# Patient Record
Sex: FEMALE | URBAN - METROPOLITAN AREA
[De-identification: names, ages, dates, MRNs, and addresses within clinical notes are randomized per-mention and may not be internally consistent; named-entity substitution may affect disease eponyms.]

---

## 2019-01-07 ENCOUNTER — OFFICE VISIT (OUTPATIENT)
Dept: FAMILY MEDICINE CLINIC | Age: 24
End: 2019-01-07

## 2019-01-07 ENCOUNTER — HOSPITAL ENCOUNTER (OUTPATIENT)
Dept: LAB | Age: 24
Discharge: HOME | End: 2019-01-07
Payer: COMMERCIAL

## 2019-01-07 VITALS
RESPIRATION RATE: 18 BRPM | BODY MASS INDEX: 25.71 KG/M2 | TEMPERATURE: 99.2 F | DIASTOLIC BLOOD PRESSURE: 84 MMHG | HEART RATE: 85 BPM | WEIGHT: 150.6 LBS | SYSTOLIC BLOOD PRESSURE: 125 MMHG | OXYGEN SATURATION: 95 % | HEIGHT: 64 IN

## 2019-01-07 DIAGNOSIS — R10.9 STOMACH PAIN: Primary | ICD-10-CM

## 2019-01-07 DIAGNOSIS — A04.8 BACTERIAL INFECTION DUE TO H. PYLORI: ICD-10-CM

## 2019-01-07 DIAGNOSIS — R11.2 NAUSEA AND VOMITING, INTRACTABILITY OF VOMITING NOT SPECIFIED, UNSPECIFIED VOMITING TYPE: ICD-10-CM

## 2019-01-07 DIAGNOSIS — R10.9 STOMACH PAIN: ICD-10-CM

## 2019-01-07 LAB
ALBUMIN SERPL-MCNC: 4 G/DL (ref 3.4–5)
ALBUMIN/GLOB SERPL: 1.1 {RATIO} (ref 0.8–1.7)
ALP SERPL-CCNC: 81 U/L (ref 45–117)
ALT SERPL-CCNC: 24 U/L (ref 13–56)
ANION GAP SERPL CALC-SCNC: 9 MMOL/L (ref 3–18)
AST SERPL-CCNC: 15 U/L (ref 15–37)
BASOPHILS # BLD: 0 K/UL (ref 0–0.1)
BASOPHILS NFR BLD: 0 % (ref 0–2)
BILIRUB SERPL-MCNC: 0.5 MG/DL (ref 0.2–1)
BUN SERPL-MCNC: 11 MG/DL (ref 7–18)
BUN/CREAT SERPL: 12 (ref 12–20)
CALCIUM SERPL-MCNC: 9.3 MG/DL (ref 8.5–10.1)
CHLORIDE SERPL-SCNC: 103 MMOL/L (ref 100–108)
CO2 SERPL-SCNC: 24 MMOL/L (ref 21–32)
CREAT SERPL-MCNC: 0.91 MG/DL (ref 0.6–1.3)
DIFFERENTIAL METHOD BLD: ABNORMAL
EOSINOPHIL # BLD: 0.1 K/UL (ref 0–0.4)
EOSINOPHIL NFR BLD: 2 % (ref 0–5)
ERYTHROCYTE [DISTWIDTH] IN BLOOD BY AUTOMATED COUNT: 12 % (ref 11.6–14.5)
GLOBULIN SER CALC-MCNC: 3.6 G/DL (ref 2–4)
GLUCOSE SERPL-MCNC: 82 MG/DL (ref 74–99)
HCT VFR BLD AUTO: 46 % (ref 35–45)
HGB BLD-MCNC: 15.7 G/DL (ref 12–16)
LIPASE SERPL-CCNC: 160 U/L (ref 73–393)
LYMPHOCYTES # BLD: 2.2 K/UL (ref 0.9–3.6)
LYMPHOCYTES NFR BLD: 27 % (ref 21–52)
MCH RBC QN AUTO: 30 PG (ref 24–34)
MCHC RBC AUTO-ENTMCNC: 34.1 G/DL (ref 31–37)
MCV RBC AUTO: 88 FL (ref 74–97)
MONOCYTES # BLD: 0.6 K/UL (ref 0.05–1.2)
MONOCYTES NFR BLD: 7 % (ref 3–10)
NEUTS SEG # BLD: 5.3 K/UL (ref 1.8–8)
NEUTS SEG NFR BLD: 64 % (ref 40–73)
PLATELET # BLD AUTO: 261 K/UL (ref 135–420)
PMV BLD AUTO: 9.2 FL (ref 9.2–11.8)
POTASSIUM SERPL-SCNC: 4.3 MMOL/L (ref 3.5–5.5)
PROT SERPL-MCNC: 7.6 G/DL (ref 6.4–8.2)
RBC # BLD AUTO: 5.23 M/UL (ref 4.2–5.3)
SODIUM SERPL-SCNC: 136 MMOL/L (ref 136–145)
WBC # BLD AUTO: 8.2 K/UL (ref 4.6–13.2)

## 2019-01-07 PROCEDURE — 83690 ASSAY OF LIPASE: CPT

## 2019-01-07 PROCEDURE — 86677 HELICOBACTER PYLORI ANTIBODY: CPT

## 2019-01-07 PROCEDURE — 85025 COMPLETE CBC W/AUTO DIFF WBC: CPT

## 2019-01-07 PROCEDURE — 80053 COMPREHEN METABOLIC PANEL: CPT

## 2019-01-07 RX ORDER — OMEPRAZOLE 20 MG/1
20 CAPSULE, DELAYED RELEASE ORAL DAILY
Qty: 30 CAP | Refills: 1 | Status: SHIPPED | OUTPATIENT
Start: 2019-01-07

## 2019-01-07 RX ORDER — ONDANSETRON 4 MG/1
4 TABLET, ORALLY DISINTEGRATING ORAL
Qty: 20 TAB | Refills: 0 | Status: SHIPPED | OUTPATIENT
Start: 2019-01-07 | End: 2019-02-18 | Stop reason: SDUPTHER

## 2019-01-07 NOTE — PATIENT INSTRUCTIONS
Nausea and Vomiting: Care Instructions  Your Care Instructions    When you are nauseated, you may feel weak and sweaty and notice a lot of saliva in your mouth. Nausea often leads to vomiting. Most of the time you do not need to worry about nausea and vomiting, but they can be signs of other illnesses. Two common causes of nausea and vomiting are stomach flu and food poisoning. Nausea and vomiting from viral stomach flu will usually start to improve within 24 hours. Nausea and vomiting from food poisoning may last from 12 to 48 hours. The doctor has checked you carefully, but problems can develop later. If you notice any problems or new symptoms, get medical treatment right away. Follow-up care is a key part of your treatment and safety. Be sure to make and go to all appointments, and call your doctor if you are having problems. It's also a good idea to know your test results and keep a list of the medicines you take. How can you care for yourself at home? · To prevent dehydration, drink plenty of fluids, enough so that your urine is light yellow or clear like water. Choose water and other caffeine-free clear liquids until you feel better. If you have kidney, heart, or liver disease and have to limit fluids, talk with your doctor before you increase the amount of fluids you drink. · Rest in bed until you feel better. · When you are able to eat, try clear soups, mild foods, and liquids until all symptoms are gone for 12 to 48 hours. Other good choices include dry toast, crackers, cooked cereal, and gelatin dessert, such as Jell-O. When should you call for help? Call 911 anytime you think you may need emergency care. For example, call if:    · You passed out (lost consciousness).    Call your doctor now or seek immediate medical care if:    · You have symptoms of dehydration, such as:  ? Dry eyes and a dry mouth. ? Passing only a little dark urine. ?  Feeling thirstier than usual.     · You have new or worsening belly pain.     · You have a new or higher fever.     · You vomit blood or what looks like coffee grounds.    Watch closely for changes in your health, and be sure to contact your doctor if:    · You have ongoing nausea and vomiting.     · Your vomiting is getting worse.     · Your vomiting lasts longer than 2 days.     · You are not getting better as expected. Where can you learn more? Go to http://fermin-chano.info/. Enter 25 [id number] in the search box to learn more about \"Nausea and Vomiting: Care Instructions. \"  Current as of: November 20, 2017  Content Version: 11.8  © 8724-9006 [employer]. Care instructions adapted under license by [employer] (which disclaims liability or warranty for this information). If you have questions about a medical condition or this instruction, always ask your healthcare professional. [address] any warranty or liability for your use of this information. Abdominal Pain: Care Instructions  Your Care Instructions    Abdominal pain has many possible causes. Some aren't serious and get better on their own in a few days. Others need more testing and treatment. If your pain continues or gets worse, you need to be rechecked and may need more tests to find out what is wrong. You may need surgery to correct the problem. Don't ignore new symptoms, such as fever, nausea and vomiting, urination problems, pain that gets worse, and dizziness. These may be signs of a more serious problem. Your doctor may have recommended a follow-up visit in the next 8 to 12 hours. If you are not getting better, you may need more tests or treatment. The doctor has checked you carefully, but problems can develop later. If you notice any problems or new symptoms, get medical treatment right away. Follow-up care is a key part of your treatment and safety.  Be sure to make and go to all appointments, and call your doctor if you are having problems. It's also a good idea to know your test results and keep a list of the medicines you take. How can you care for yourself at home? · Rest until you feel better. · To prevent dehydration, drink plenty of fluids, enough so that your urine is light yellow or clear like water. Choose water and other caffeine-free clear liquids until you feel better. If you have kidney, heart, or liver disease and have to limit fluids, talk with your doctor before you increase the amount of fluids you drink. · If your stomach is upset, eat mild foods, such as rice, dry toast or crackers, bananas, and applesauce. Try eating several small meals instead of two or three large ones. · Wait until 48 hours after all symptoms have gone away before you have spicy foods, alcohol, and drinks that contain caffeine. · Do not eat foods that are high in fat. · Avoid anti-inflammatory medicines such as aspirin, ibuprofen (Advil, Motrin), and naproxen (Aleve). These can cause stomach upset. Talk to your doctor if you take daily aspirin for another health problem. When should you call for help? Call 911 anytime you think you may need emergency care. For example, call if:    · You passed out (lost consciousness).     · You pass maroon or very bloody stools.     · You vomit blood or what looks like coffee grounds.     · You have new, severe belly pain.    Call your doctor now or seek immediate medical care if:    · Your pain gets worse, especially if it becomes focused in one area of your belly.     · You have a new or higher fever.     · Your stools are black and look like tar, or they have streaks of blood.     · You have unexpected vaginal bleeding.     · You have symptoms of a urinary tract infection. These may include:  ? Pain when you urinate. ? Urinating more often than usual.  ?  Blood in your urine.     · You are dizzy or lightheaded, or you feel like you may faint.    Watch closely for changes in your health, and be sure to contact your doctor if:    · You are not getting better after 1 day (24 hours). Where can you learn more? Go to http://fermin-chano.info/. Enter V041 in the search box to learn more about \"Abdominal Pain: Care Instructions. \"  Current as of: November 20, 2017  Content Version: 11.8  © 7743-4241 [employer]. Care instructions adapted under license by [employer] (which disclaims liability or warranty for this information). If you have questions about a medical condition or this instruction, always ask your healthcare professional. [place] [address] any warranty or liability for your use of this information.

## 2019-01-07 NOTE — PROGRESS NOTES
Chief Complaint   Patient presents with    Vomiting         Mandy Barajas is a 20y.o. year old  female who presents for the first time to our practice with c/o persistent Nausea, Vomiting and Stomach pain. Symptoms started the 1 week of November. When to [facility] where she was prescribed Zofran. States that it helps with nausea. States that she usually vomit early in the morning and at night after dinner. Vomitus is clear and thin. Last time she vomited was this past Saturday. Pain is worse when her stomach is empty but also have pain (unsettling stomach) after she eats. Denies chest pain, bloody emesis, bloody or tarry stools. Previous PCP: [facility].    Med review done  Medical Hx updated in the chart    Will request medical record for review. Review of Systems:   Pt denies: Wt loss, Fever/Chills, HA, Visual changes, Fatigue, Chest pain, SOB, MUIR, Abd pain, N/V/D/C, Blood in stool or urine, Edema. Pertinent positive as above in HPI. All others negative. There is no problem list on file for this patient. History reviewed. No pertinent past medical history. Past Surgical History:   Procedure Laterality Date    HX TONSIL AND ADENOIDECTOMY         No Known Allergies    Family History   Problem Relation Age of Onset    Asthma Brother      Objective:     Vitals:    01/07/19 1456   BP: 125/84   Pulse: 85   Resp: 18   Temp: 99.2 °F (37.3 °C)   TempSrc: Oral   SpO2: 95%   Weight: 150 lb 9.6 oz (68.3 kg)   Height: 5' 3.5\" (1.613 m)     Body mass index is 26.26 kg/m². Exam:   Appearance: alert, well appearing, in no respiratory distress and well hydrated.   HEENT:  NC/AT, TMs clear, Posterior Pharynx nl, pink conjunctivae, anicteric sclerae  Heart:  RRR without M/R/G  Lungs:  CTAB, no rhonchi, rales, or wheezes with good air exchange  Abdomen: mild diffuse tenderness; no guarding; soft, normoactive BS, non tender, no organomegaly, no palpable mass   Neuro:AAO x 3, no focal deficits    Assessment/ Plan:     Diagnoses and all orders for this visit:    1. Stomach pain  -     CBC WITH AUTOMATED DIFF; Future  -     METABOLIC PANEL, COMPREHENSIVE; Future  -     LIPASE; Future  -     H PYLORI ABS, IGA AND IGG; Future  -     ondansetron (ZOFRAN ODT) 4 mg disintegrating tablet; Take 1 Tab by mouth every eight (8) hours as needed for Nausea. -     omeprazole (PRILOSEC) 20 mg capsule; Take 1 Cap by mouth daily. 2. Nausea and vomiting, intractability of vomiting not specified, unspecified vomiting type  -     CBC WITH AUTOMATED DIFF; Future  -     METABOLIC PANEL, COMPREHENSIVE; Future  -     LIPASE; Future  -     H PYLORI ABS, IGA AND IGG; Future  -     ondansetron (ZOFRAN ODT) 4 mg disintegrating tablet; Take 1 Tab by mouth every eight (8) hours as needed for Nausea. I have discussed the diagnosis with the patient and the intended plan as seen in the above orders. The patient has received an after-visit summary and questions were answered concerning future plans. I have reviewed the plan of care with the patient, accepted their input and they are in agreement with the treatment goals. Follow-up Disposition:  Return in about 4 weeks (around 2/4/2019).   SILVER Mccann  1/7/2019   3:54 PM

## 2019-01-07 NOTE — PROGRESS NOTES
Yris Mena is a 20 y.o. female  Vomiting since November. Sometimes in the morning when not eaten anything and sometimes after eating. Ha from vomiting. Normal stools. Abdominal pain when vomiting. [facility]. Took last one today.

## 2019-01-08 LAB
H PYLORI IGA SER-ACNC: 11.4 UNITS (ref 0–8.9)
H PYLORI IGG SER IA-ACNC: 0.91 INDEX VALUE (ref 0–0.79)

## 2019-01-10 RX ORDER — AMOXICILLIN 500 MG/1
1000 CAPSULE ORAL 2 TIMES DAILY
Qty: 56 CAP | Refills: 0 | Status: SHIPPED | OUTPATIENT
Start: 2019-01-10 | End: 2019-01-24

## 2019-01-10 RX ORDER — CLARITHROMYCIN 500 MG/1
500 TABLET, FILM COATED ORAL 2 TIMES DAILY
Qty: 28 TAB | Refills: 0 | Status: SHIPPED | OUTPATIENT
Start: 2019-01-10 | End: 2019-01-24

## 2019-01-10 NOTE — PROGRESS NOTES
Please let patient know that blood work shows an infection in her gut. She will start a treatment that will last 2 weeks. Shell take two different types of Antibiotic and Prilosec (stomac medication she was prescribed at her vit) twice a day. Thank you.

## 2019-01-14 ENCOUNTER — TELEPHONE (OUTPATIENT)
Dept: FAMILY MEDICINE CLINIC | Age: 24
End: 2019-01-14

## 2019-01-14 NOTE — TELEPHONE ENCOUNTER
Patient called for the results of her lab work. Advised the patient with the NP had written in her chart regarding the results. Patient stated that she understood and will take the medication that has been prescribed to her. Patient will call once the prescriptions have been complete if she is not feeling any better.

## 2019-02-18 ENCOUNTER — OFFICE VISIT (OUTPATIENT)
Dept: FAMILY MEDICINE CLINIC | Age: 24
End: 2019-02-18

## 2019-02-18 VITALS
BODY MASS INDEX: 24.24 KG/M2 | OXYGEN SATURATION: 98 % | HEIGHT: 64 IN | DIASTOLIC BLOOD PRESSURE: 80 MMHG | WEIGHT: 142 LBS | TEMPERATURE: 98.5 F | HEART RATE: 77 BPM | RESPIRATION RATE: 18 BRPM | SYSTOLIC BLOOD PRESSURE: 112 MMHG

## 2019-02-18 DIAGNOSIS — A04.8 BACTERIAL INFECTION DUE TO H. PYLORI: Primary | ICD-10-CM

## 2019-02-18 DIAGNOSIS — R11.2 NAUSEA AND VOMITING, INTRACTABILITY OF VOMITING NOT SPECIFIED, UNSPECIFIED VOMITING TYPE: ICD-10-CM

## 2019-02-18 DIAGNOSIS — R10.9 STOMACH PAIN: ICD-10-CM

## 2019-02-18 RX ORDER — ONDANSETRON 4 MG/1
4 TABLET, ORALLY DISINTEGRATING ORAL
Qty: 20 TAB | Refills: 0 | Status: SHIPPED | OUTPATIENT
Start: 2019-02-18

## 2019-02-18 NOTE — PROGRESS NOTES
Progress Note  Today's Date:  2019   Patient:  Aspen Garcia  Patient :  1995    Subjective:     Chief Complaint   Patient presents with    Nausea       Aspen Garcia is a 20 y.o. female who presents for follow up follow up of Stomach pain and Nausea. Rest was positive for H Pylori last month and was treated with two weeks course of antibiotic and PPI. States that after finishing treatment, symptoms started coming back although not like previously. Is still taking Prilosec daily and Zofran (as needed). Has intermittent pain and nausea. Denies fevers, chills or vomiting. Current Outpatient Meds and Allergies     Current Outpatient Medications on File Prior to Visit   Medication Sig Dispense Refill    omeprazole (PRILOSEC) 20 mg capsule Take 1 Cap by mouth daily. 30 Cap 1     No current facility-administered medications on file prior to visit. These medications have been reviewed and reconciled with the patient during today's visit. No Known Allergies    ROS:     CONST:   Denies fatigue, weight change, appetite change   NEURO:   Denies headaches, vision changes, dizziness, loss of consciousness  CV:      Denies chest pain, palpitations, orthopnea, PND  PULM:  Denies SOB, wheezing, cough, hemoptysis  GI:             Denies nausea, vomiting, abdominal pain, greasy stools, blood in stool,     diarrhea, constipation  :       Denies dysuria, hematuria, change in urine    Objective:     VS:    Visit Vitals  /80 (BP 1 Location: Left arm, BP Patient Position: Sitting)   Pulse 77   Temp 98.5 °F (36.9 °C) (Oral)   Resp 18   Ht 5' 3.5\" (1.613 m)   Wt 142 lb (64.4 kg)   LMP 2019 (Approximate)   SpO2 98%   BMI 24.76 kg/m²       General:   Well-nourished, well-groomed, pleasant, alert, in no acute distress.      Head:  Normocephalic, atraumatic, MMM,   Cardiovasc:   Regular rate and rhythm, no murmurs, no rubs, no gallops,   Pulmonary:   Clear breath sounds bilaterally, good air movement, no wheezing, no rales, no rhonchi, normal respiratory effort  Abdomen:   Abdomen soft, nontender, nondistended, NABS,   Neuro:   Alert, conversant, appropriate, following commands, no focal deficits. Hospital Outpatient Visit on 01/07/2019   Component Date Value Ref Range Status    WBC 01/07/2019 8.2  4.6 - 13.2 K/uL Final    RBC 01/07/2019 5.23  4.20 - 5.30 M/uL Final    HGB 01/07/2019 15.7  12.0 - 16.0 g/dL Final    HCT 01/07/2019 46.0* 35.0 - 45.0 % Final    MCV 01/07/2019 88.0  74.0 - 97.0 FL Final    MCH 01/07/2019 30.0  24.0 - 34.0 PG Final    MCHC 01/07/2019 34.1  31.0 - 37.0 g/dL Final    RDW 01/07/2019 12.0  11.6 - 14.5 % Final    PLATELET 11/73/2618 331  135 - 420 K/uL Final    MPV 01/07/2019 9.2  9.2 - 11.8 FL Final    NEUTROPHILS 01/07/2019 64  40 - 73 % Final    LYMPHOCYTES 01/07/2019 27  21 - 52 % Final    MONOCYTES 01/07/2019 7  3 - 10 % Final    EOSINOPHILS 01/07/2019 2  0 - 5 % Final    BASOPHILS 01/07/2019 0  0 - 2 % Final    ABS. NEUTROPHILS 01/07/2019 5.3  1.8 - 8.0 K/UL Final    ABS. LYMPHOCYTES 01/07/2019 2.2  0.9 - 3.6 K/UL Final    ABS. MONOCYTES 01/07/2019 0.6  0.05 - 1.2 K/UL Final    ABS. EOSINOPHILS 01/07/2019 0.1  0.0 - 0.4 K/UL Final    ABS.  BASOPHILS 01/07/2019 0.0  0.0 - 0.1 K/UL Final    DF 01/07/2019 AUTOMATED    Final    Sodium 01/07/2019 136  136 - 145 mmol/L Final    Potassium 01/07/2019 4.3  3.5 - 5.5 mmol/L Final    Chloride 01/07/2019 103  100 - 108 mmol/L Final    CO2 01/07/2019 24  21 - 32 mmol/L Final    Anion gap 01/07/2019 9  3.0 - 18 mmol/L Final    Glucose 01/07/2019 82  74 - 99 mg/dL Final    BUN 01/07/2019 11  7.0 - 18 MG/DL Final    Creatinine 01/07/2019 0.91  0.6 - 1.3 MG/DL Final    BUN/Creatinine ratio 01/07/2019 12  12 - 20   Final    GFR est AA 01/07/2019 >60  >60 ml/min/1.73m2 Final    GFR est non-AA 01/07/2019 >60  >60 ml/min/1.73m2 Final    Comment: (NOTE)  Estimated GFR is calculated using the Modification of Diet in Renal   Disease (MDRD) Study equation, reported for both  Americans   (GFRAA) and non- Americans (GFRNA), and normalized to 1.73m2   body surface area. The physician must decide which value applies to   the patient. The MDRD study equation should only be used in   individuals age 25 or older. It has not been validated for the   following: pregnant women, patients with serious comorbid conditions,   or on certain medications, or persons with extremes of body size,   muscle mass, or nutritional status.  Calcium 01/07/2019 9.3  8.5 - 10.1 MG/DL Final    Bilirubin, total 01/07/2019 0.5  0.2 - 1.0 MG/DL Final    ALT (SGPT) 01/07/2019 24  13 - 56 U/L Final    AST (SGOT) 01/07/2019 15  15 - 37 U/L Final    Alk.  phosphatase 01/07/2019 81  45 - 117 U/L Final    Protein, total 01/07/2019 7.6  6.4 - 8.2 g/dL Final    Albumin 01/07/2019 4.0  3.4 - 5.0 g/dL Final    Globulin 01/07/2019 3.6  2.0 - 4.0 g/dL Final    A-G Ratio 01/07/2019 1.1  0.8 - 1.7   Final    Lipase 01/07/2019 160  73 - 393 U/L Final    H. pylori Ab, IgA 01/07/2019 11.4* 0.0 - 8.9 units Final    Comment: (NOTE)                                 Negative          <9.0                                 Equivocal   9.0 - 11.0                                 Positive         >11.0  Performed At: [facility]  [employer] [address] [id number]  Mary Carmen Flores MD [id number]      H. pylori Ab, IgG 01/07/2019 0.91* 0.00 - 0.79 Index Value Final    Comment: (NOTE)                              Negative           <0.80                              Equivocal    0.80 - 0.89                              Positive           >0.89  Performed At: [facility]  [employer] [address] [id number]  Mary Carmen Flores MD [id number]     Admission on 12/01/2018, Discharged on 12/01/2018   Component Date Value Ref Range Status    Ventricular Rate 12/01/2018 77  BPM Final    Atrial Rate 12/01/2018 77  BPM Final    P-R Interval 12/01/2018 176  ms Final    QRS Duration 12/01/2018 86  ms Final    Q-T Interval 12/01/2018 372  ms Final    QTC Calculation (Bezet) 12/01/2018 420  ms Final    Calculated P Axis 12/01/2018 40  degrees Final    Calculated R Axis 12/01/2018 19  degrees Final    Calculated T Axis 12/01/2018 54  degrees Final    Diagnosis 12/01/2018    Final                    Value:Normal sinus rhythm with sinus arrhythmia  Normal ECG  No previous ECGs available  Confirmed by Gene Gilliam M.D., Leverne Blow ([id number]) on 12/3/2018 8:19:12 AM      WBC 12/01/2018 11.2* 4.0 - 11.0 1000/mm3 Final    RBC 12/01/2018 5.41* 3.60 - 5.20 M/uL Final    HGB 12/01/2018 15.9  13.0 - 17.2 gm/dl Final    HCT 12/01/2018 45.8  37.0 - 50.0 % Final    MCV 12/01/2018 84.7  80.0 - 98.0 fL Final    MCH 12/01/2018 29.4  25.4 - 34.6 pg Final    MCHC 12/01/2018 34.7  30.0 - 36.0 gm/dl Final    PLATELET 61/87/3014 094  140 - 450 1000/mm3 Final    MPV 12/01/2018 9.1  6.0 - 10.0 fL Final    RDW-SD 12/01/2018 35.8* 36.4 - 46.3   Final    NRBC 12/01/2018 0  0 - 0   Final    IMMATURE GRANULOCYTES 12/01/2018 0.4  0.0 - 3.0 % Final    Comment: IG - Immature granulocytes (promyelocytes + myelocytes + metamyelocytes), their presence  indicates a left shift. An IG > 3% may predict positive blood cultures with 98% specificity.  (P<0.04) and 92% Positive Predictive Value (Jorje-Lynne)1. Increased immature granulocytes  assist with the detection of infection and/or inflammation and may be present at an early stage  and are more sensitive and specific than band counts.       NEUTROPHILS 12/01/2018 56.6  34 - 64 % Final    LYMPHOCYTES 12/01/2018 31.6  28 - 48 % Final    MONOCYTES 12/01/2018 8.3  1 - 13 % Final    EOSINOPHILS 12/01/2018 2.7  0 - 5 % Final    BASOPHILS 12/01/2018 0.4  0 - 3 % Final    Sodium 12/01/2018 139  136 - 145 mEq/L Final    Potassium 12/01/2018 3.3* 3.5 - 5.1 mEq/L Final    Chloride 12/01/2018 104  98 - 107 mEq/L Final    CO2 12/01/2018 24  21 - 32 mEq/L Final    Glucose 12/01/2018 97  74 - 106 mg/dl Final    BUN 12/01/2018 11  7 - 25 mg/dl Final    Creatinine 12/01/2018 0.9  0.6 - 1.3 mg/dl Final    GFR est AA 12/01/2018 >60.0    Final    Comment: THE [facility] STATES THAT THE MDRD STUDY EQUATION SHOULD ONLY BE USED ON  INDIVIDUALS 18 OR OLDER. THE REPORT ALSO NOTES THAT THE MDRD STUDY EQUATION HAS NOT BEEN  VALIDATED FOR USE WITH THE ELDERLY (OVER 79YEARS OF AGE), PREGNANT WOMEN, PATIENTS WITH SERIOUS  COMORBID CONDITIONS, OR PERSONS WITH EXTREMES OF BODY SIZE, MUSCLE MASS, OR NUTRITIONAL STATUS. APPLICATION OF THE EQUATION TO THESE PATIENT GROUPS MAY LEAD TO ERRORS IN GFR ESTIMATION. GFR  ESTIMATING EQUATIONS HAVE POORER AGREEMENT WITH MEASURED GFR FOR ILL HOSPITALIZED PATIENTS AND  FOR PEOPLE WITH NEAR NORMAL KIDNEY FUNCTION THAN FOR SUBJECTS IN THE MDRD STUDY. VALIDATION  STUDIES ARE IN PROGRESS TO EVALUATE THE MDRD STUDY EQUATION FOR ADDITIONAL ETHNIC GROUPS, THE  ELDERLY, VARIOUS DISEASE CONDITIONS, AND PEOPLE WITH NORMAL KIDNEY FUNCTION. GFRA----REFERS TO   GFRO---REFERS TO OTHER RACES  REFERENCES AVAILABLE UPON REQUEST.      GFR est non-AA 12/01/2018 >60    Final    Calcium 12/01/2018 9.5  8.5 - 10.1 mg/dl Final    Anion gap 12/01/2018 10  5 - 15 mmol/L Final    Troponin-I 12/01/2018 <0.015  0.000 - 0.045 ng/ml Final    Comment: The presence of detectable troponin above the reference range indicates myocardial injury which  maybe due to ischemia, myocarditis, trauma, etc. Clinical correlation is necessary to establish  the significance of this finding. NOTE: The reference range is based on the 99th percentile of the referent population         AST (SGOT) 12/01/2018 18  15 - 37 U/L Final    ALT (SGPT) 12/01/2018 26  12 - 78 U/L Final    Alk.  phosphatase 12/01/2018 76  45 - 117 U/L Final    Bilirubin, total 12/01/2018 0.7  0.2 - 1.0 mg/dl Final    Protein, total 12/01/2018 8.0 6.4 - 8.2 gm/dl Final    Albumin 12/01/2018 4.0  3.4 - 5.0 gm/dl Final    Bilirubin, direct 12/01/2018 0.1  0.0 - 0.2 mg/dl Final    Lipase 12/01/2018 151  73 - 393 U/L Final    TSH 12/01/2018 5.230* 0.358 - 3.740 uIU/mL Final    Free T4 12/01/2018 1.23  0.76 - 1.46 ng/dl Final    Glucose 12/01/2018 Negative  NEGATIVE,Negative mg/dl Final    Bilirubin 12/01/2018 Negative  NEGATIVE,Negative   Final    Ketone 12/01/2018 15* NEGATIVE,Negative mg/dl Final    Specific gravity 12/01/2018 1.010  1.005 - 1.030   Final    Blood 12/01/2018 Negative  NEGATIVE,Negative   Final    pH (UA) 12/01/2018 8.5  5 - 9   Final    Protein 12/01/2018 Negative  NEGATIVE,Negative mg/dl Final    Urobilinogen 12/01/2018 0.2  0.0 - 1.0 EU/dl Final    Nitrites 12/01/2018 Negative  NEGATIVE,Negative   Final    Leukocyte Esterase 12/01/2018 Trace* NEGATIVE,Negative   Final    Color 12/01/2018 Yellow    Final    Appearance 12/01/2018 Clear    Final    : [id number]    Epithelial cells, squamous 12/01/2018 1-4  /LPF Final    WBC 12/01/2018 1-4  /HPF Final    RBC 12/01/2018 1-4  /HPF Final    Bacteria 12/01/2018 1+  /HPF Final    D DIMER 12/01/2018 0.14  0.01 - 0.50 ug/mL (FEU) Final    THIS TEST IS FDA APPROVED TO EXCLUDE DEEP VEIN THROMBOSIS AND PULMONARY EMBOLISM.  HCG urine, QL 12/01/2018 negative  NEGATIVE,Negative,negative   Final    : [id number]       Assessment/Plan:       1. Bacterial infection due to H. pylori  - Will recheck lab mid next month. Advised to stop Prilosec to weeks prior to lab draw. - H PYLORI ABS, IGA AND IGG; Future    2. Stomach pain  - H PYLORI ABS, IGA AND IGG; Future  - ondansetron (ZOFRAN ODT) 4 mg disintegrating tablet; Take 1 Tab by mouth every eight (8) hours as needed for Nausea. Dispense: 20 Tab; Refill: 0    3.  Nausea and vomiting, intractability of vomiting not specified, unspecified vomiting type  - H PYLORI ABS, IGA AND IGG; Future  - ondansetron (ZOFRAN ODT) 4 mg disintegrating tablet; Take 1 Tab by mouth every eight (8) hours as needed for Nausea. Dispense: 20 Tab; Refill: 0    I have discussed the diagnosis with the patient and the intended plan as seen in the above orders. The patient has received an after-visit summary along with patient information handout. I have discussed medication side effects and warnings with the patient as well. Pt verbalized understanding. Follow-up Disposition:  Return in about 1 month (around 3/18/2019), or if symptoms worsen or fail to improve.   SILVER Vargas  2/18/2019, 10:51 AM

## 2019-02-18 NOTE — PROGRESS NOTES
Hilda Phoenix is a 20 y.o. female here for follow up visit. She states she is doing a little better but still getting nausea. She noticed that after her round of antibiotics her symptoms came back, just not as frequent.

## 2019-02-18 NOTE — PATIENT INSTRUCTIONS
Learning About Acid-Reducing Medicines  What are they? Acid-reducing medicines can help relieve heartburn and other symptoms of indigestion. They can help prevent damage to your digestive system from stomach acids. They also are used to treat reflux and ulcer symptoms. These medicines include H2 blockers and proton pump inhibitors (PPIs). They help your stomach make less acid. You can buy them over the counter. Some of them also come in prescription strengths. Antacids can also help relieve heartburn symptoms. They reduce the acid that is already in your stomach. You can buy them over the counter. Which medicine is best for you depends on what is causing your symptoms. How do they work? Acid-reducing medicines work in two ways. H2 blockers and proton pump inhibitors (PPIs) lower the amount of acid your stomach makes. They don't work on the acid that's already there. Antacids work by making stomach juices less acidic. But your heartburn may come back as your stomach makes more acid. What are some examples? Examples of acid reducers include:  H2 blockers. Tagamet  Pepcid  Axid  Zantac  Proton pump inhibitors (PPIs). Nexium  Prevacid  Prilosec  Zegerid  Protonix  Aciphex  Antacids. Gaviscon  Mylanta  Maalox  Tums  What are side effects might you have? Many people don't have side effects. And minor side effects might go away after a while. H2 blockers can cause headaches or make you dizzy. They might cause diarrhea or constipation. You may have nausea and vomiting. PPIs can cause headaches and diarrhea. Using them for a long time may raise your risk for infections or broken bones. Some antacids can cause constipation or diarrhea. The brands vary in the ingredients they use. They can have different side effects. If you use too much heartburn medicine, your body may not get enough of some minerals from your food. How can you take these medicines safely?   Some H2 blockers and PPIs can affect how other medicines work. Tell your doctor if you use other medicines. He or she may change the dose or give you a different medicine. Many antacids have aspirin in them. Read the label to make sure that you don't take too much. Too much aspirin can be harmful. Be safe with medicines. Take your medicines exactly as prescribed. If you take over-the-counter medicine, be sure to read and follow all instructions on the label. Call your doctor if you think you are having a problem with your medicine. Check with your doctor or pharmacist before you use any other medicines. This includes over-the-counter medicines. Tell your doctor about all of the medicines, vitamins, herbal products, and supplements you take. Taking some medicines together can cause problems. Follow-up care is a key part of your treatment and safety. Be sure to make and go to all appointments, and call your doctor if you are having problems. It's also a good idea to know your test results and keep a list of the medicines you take. Where can you learn more? Go to http://fermin-chano.info/. Enter [phone] in the search box to learn more about \"Learning About Acid-Reducing Medicines. \"  Current as of: March 27, 2018  Content Version: 11.9  © 7096-9479 "[employer] Care instructions adapted under license by [employer] (which disclaims liability or warranty for this information). If you have questions about a medical condition or this instruction, always ask your healthcare professional. [place] [address] any warranty or liability for your use of this information. H. Pylori Bacterial Infection: Care Instructions  Your Care Instructions    Your test shows the presence of Helicobacter pylori ( H. pylori), a kind of bacterium that lives in the lining of the stomach. Many people have H. pylori in their stomachs and do not develop problems.  But sometimes H. pylori causes an upset stomach or a sore (ulcer) in the stomach lining. Most stomach ulcers are caused by H. pylori. Symptoms of an ulcer include gnawing or burning pain in the belly that can last minutes or hours. Eating food or taking antacids helps relieve the pain, but the symptoms may come back after a while. Antibiotic medicine can cure an H. pylori infection. Follow-up care is a key part of your treatment and safety. Be sure to make and go to all appointments, and call your doctor if you are having problems. It's also a good idea to know your test results and keep a list of the medicines you take. How can you care for yourself at home? · Take your antibiotics as directed. Do not stop taking them just because you feel better. You need to take the full course of antibiotics. · If your doctor prescribes other medicine, take it exactly as prescribed. Call your doctor if you think you are having a problem with your medicine. You will get more details on the specific medicine your doctor prescribes. · Eat a healthy, balanced diet. ? Eat smaller meals, and eat more often. Be sure to eat at least three meals a day. ? Avoid heavily spiced or greasy foods. ? Do not drink beverages that have caffeine if they bother your stomach. These include coffee, tea, and soda. · Do not smoke. Smoking slows the healing of your ulcer and can make an ulcer come back. If you need help quitting, talk to your doctor about stop-smoking programs and medicines. These can increase your chances of quitting for good. · Limit how much alcohol you drink. Alcohol can slow healing of an ulcer and can make your symptoms worse. · Wash your hands after going to the bathroom. · Avoid aspirin, ibuprofen, or other anti-inflammatory medicines, because they can irritate the stomach. If you need pain medicine, try acetaminophen (Tylenol). When should you call for help? Call 911 anytime you think you may need emergency care.  For example, call if:    · You vomit blood or what looks like coffee grounds.     · Your stools are maroon or very bloody.    Call your doctor now or seek immediate medical care if:    · You have new or worse belly pain.     · You are vomiting.     · Your stools are black and look like tar, or they have streaks of blood.    Watch closely for changes in your health, and be sure to contact your doctor if:    · You do not get better as expected. Where can you learn more? Go to http://fermin-chano.info/. Enter M522 in the search box to learn more about \"H. Pylori Bacterial Infection: Care Instructions. \"  Current as of: March 27, 2018  Content Version: 11.9  © 2497-3513 [employer]. Care instructions adapted under license by [employer] (which disclaims liability or warranty for this information). If you have questions about a medical condition or this instruction, always ask your healthcare professional. [address] any warranty or liability for your use of this information.

## 2019-03-11 DIAGNOSIS — R10.9 STOMACH PAIN: ICD-10-CM

## 2019-03-11 DIAGNOSIS — R11.2 NAUSEA AND VOMITING, INTRACTABILITY OF VOMITING NOT SPECIFIED, UNSPECIFIED VOMITING TYPE: ICD-10-CM

## 2019-03-11 DIAGNOSIS — A04.8 BACTERIAL INFECTION DUE TO H. PYLORI: ICD-10-CM
